# Patient Record
Sex: MALE | Race: WHITE | NOT HISPANIC OR LATINO | URBAN - METROPOLITAN AREA
[De-identification: names, ages, dates, MRNs, and addresses within clinical notes are randomized per-mention and may not be internally consistent; named-entity substitution may affect disease eponyms.]

---

## 2017-07-08 ENCOUNTER — EMERGENCY (EMERGENCY)
Facility: HOSPITAL | Age: 42
LOS: 1 days | Discharge: HOME | End: 2017-07-08
Attending: EMERGENCY MEDICINE | Admitting: EMERGENCY MEDICINE
Payer: COMMERCIAL

## 2017-07-08 VITALS
DIASTOLIC BLOOD PRESSURE: 75 MMHG | RESPIRATION RATE: 16 BRPM | SYSTOLIC BLOOD PRESSURE: 111 MMHG | HEART RATE: 92 BPM | TEMPERATURE: 98 F | OXYGEN SATURATION: 100 %

## 2017-07-08 PROCEDURE — 73562 X-RAY EXAM OF KNEE 3: CPT

## 2017-07-08 PROCEDURE — 99283 EMERGENCY DEPT VISIT LOW MDM: CPT

## 2017-07-08 PROCEDURE — 99283 EMERGENCY DEPT VISIT LOW MDM: CPT | Mod: 25

## 2017-07-08 PROCEDURE — 73562 X-RAY EXAM OF KNEE 3: CPT | Mod: 26,RT

## 2017-07-08 RX ORDER — IBUPROFEN 200 MG
400 TABLET ORAL ONCE
Qty: 0 | Refills: 0 | Status: COMPLETED | OUTPATIENT
Start: 2017-07-08 | End: 2017-07-08

## 2017-07-08 RX ADMIN — Medication 400 MILLIGRAM(S): at 18:50

## 2017-07-08 NOTE — ED PROVIDER NOTE - PHYSICAL EXAMINATION
NAD, VSS, Afebrile, No spinal tender, + right knee laterally tender without obvious swelling, full ROMs, N/V- intact, No posterior or calf tender.

## 2017-07-08 NOTE — ED PROVIDER NOTE - MEDICAL DECISION MAKING DETAILS
42yo male pt, no PMHx c/o right knee pain s/p injury, his leg slipped and he felt right knee snap today. + worsening pain with weight bearing. Denies fall or other injuries. Denies sensory changes or weakness to extremities. s/p twisted, pain control, xray, knee immobilizer, f/u w ortho, reassess

## 2017-07-08 NOTE — ED PROVIDER NOTE - OBJECTIVE STATEMENT
40yo male pt, no PMHx c/o right knee pain s/p injury, his leg slipped and he felt right knee snap today. + worsening pain with weight bearing. Denies fall or other injuries. 40yo male pt, no PMHx c/o right knee pain s/p injury, his leg slipped and he felt right knee snap today. + worsening pain with weight bearing. Denies fall or other injuries. Denies sensory changes or weakness to extremities.

## 2017-07-08 NOTE — ED PROCEDURE NOTE - CPROC ED POST PROC CARE GUIDE1
Verbal/written post procedure instructions were given to patient/caregiver./Instructed patient/caregiver to follow-up with primary care physician./Instructed patient/caregiver regarding signs and symptoms of infection./Elevate the injured extremity as instructed./Keep the cast/splint/dressing clean and dry.

## 2017-07-08 NOTE — ED ADULT NURSE NOTE - OBJECTIVE STATEMENT
41 yr old male with wife c/o R lateral knee joint like pain, pain with wt bearing, s/p slip while at "pool club", heard crack, since then c/o pain with wt bearing, no deformity/ecchymosis noted, +AROM and straight leg raise intact, +pulses/caprefill intact, took motrin prior to arrival to ED, wife present 41 yr old male with wife c/o R lateral knee joint pain, pain with wt bearing, s/p slip while at "pool club", heard crack, since then c/o pain with wt bearing, no deformity/ecchymosis noted, +AROM and straight leg raise intact, +pulses/caprefill intact, took motrin prior to arrival to ED, wife present

## 2017-07-08 NOTE — ED PROVIDER NOTE - ATTENDING CONTRIBUTION TO CARE
40yo male pt, no PMHx c/o right knee pain s/p injury, his leg slipped and he felt right knee snap today. + worsening pain with weight bearing. Denies fall or other injuries. Denies sensory changes or weakness to extremities. s/p twisted, pain control, xray, knee immobilizer, f/u w ortho, reassess

## 2017-07-11 ENCOUNTER — APPOINTMENT (OUTPATIENT)
Age: 42
End: 2017-07-11

## 2017-07-11 DIAGNOSIS — S76.311A STRAIN OF MUSCLE, FASCIA AND TENDON OF THE POSTERIOR MUSCLE GROUP AT THIGH LEVEL, RIGHT THIGH, INITIAL ENCOUNTER: ICD-10-CM

## 2017-07-11 PROBLEM — Z00.00 ENCOUNTER FOR PREVENTIVE HEALTH EXAMINATION: Status: ACTIVE | Noted: 2017-07-11

## 2020-07-25 ENCOUNTER — TRANSCRIPTION ENCOUNTER (OUTPATIENT)
Age: 45
End: 2020-07-25

## 2021-11-22 ENCOUNTER — TRANSCRIPTION ENCOUNTER (OUTPATIENT)
Age: 46
End: 2021-11-22

## 2022-09-13 ENCOUNTER — OFFICE (OUTPATIENT)
Dept: URBAN - METROPOLITAN AREA CLINIC 27 | Facility: CLINIC | Age: 47
Setting detail: OPHTHALMOLOGY
End: 2022-09-13
Payer: COMMERCIAL

## 2022-09-13 DIAGNOSIS — H01.002: ICD-10-CM

## 2022-09-13 DIAGNOSIS — H52.4: ICD-10-CM

## 2022-09-13 DIAGNOSIS — H01.004: ICD-10-CM

## 2022-09-13 DIAGNOSIS — H01.005: ICD-10-CM

## 2022-09-13 DIAGNOSIS — H01.001: ICD-10-CM

## 2022-09-13 PROCEDURE — 92004 COMPRE OPH EXAM NEW PT 1/>: CPT | Performed by: OPHTHALMOLOGY

## 2022-09-13 PROCEDURE — 92015 DETERMINE REFRACTIVE STATE: CPT | Performed by: OPHTHALMOLOGY

## 2022-09-13 ASSESSMENT — LID EXAM ASSESSMENTS
OS_BLEPHARITIS: LLL LUL 1+ 2+
OD_BLEPHARITIS: RLL RUL 1+ 2+

## 2022-09-13 ASSESSMENT — TONOMETRY
OD_IOP_MMHG: 17
OS_IOP_MMHG: 17

## 2022-09-13 ASSESSMENT — CONFRONTATIONAL VISUAL FIELD TEST (CVF)
OD_FINDINGS: FULL
OS_FINDINGS: FULL

## 2022-09-16 PROBLEM — H52.7 REFRACTIVE ERROR ; BOTH EYES: Status: ACTIVE | Noted: 2022-09-13

## 2022-09-16 ASSESSMENT — VISUAL ACUITY
OD_BCVA: 20/30
OS_BCVA: 20/30-2

## 2022-09-16 ASSESSMENT — AXIALLENGTH_DERIVED
OD_AL: 22.9609
OD_AL: 23.0535
OS_AL: 23.0974
OS_AL: 23.0974

## 2022-09-16 ASSESSMENT — REFRACTION_CURRENTRX
OS_AXIS: 089
OD_OVR_VA: 20/
OD_CYLINDER: +0.75
OS_SPHERE: +0.50
OS_CYLINDER: +0.50
OS_OVR_VA: 20/
OD_SPHERE: +0.50
OD_AXIS: 094

## 2022-09-16 ASSESSMENT — KERATOMETRY
OD_AXISANGLE_DEGREES: 083
OS_K2POWER_DIOPTERS: 45.50
OD_K1POWER_DIOPTERS: 44.00
OD_K2POWER_DIOPTERS: 46.00
OS_AXISANGLE_DEGREES: 091
OS_K1POWER_DIOPTERS: 44.25

## 2022-09-16 ASSESSMENT — REFRACTION_MANIFEST
OD_SPHERE: -0.50
OD_CYLINDER: +1.50
OD_ADD: +2.00
OS_CYLINDER: +1.50
OS_SPHERE: -0.75
OD_VA1: 20/20
OS_AXIS: 80
OS_VA1: 20/20
OD_AXIS: 95
OS_ADD: +2.00

## 2022-09-16 ASSESSMENT — REFRACTION_AUTOREFRACTION
OS_AXIS: 087
OS_CYLINDER: +1.50
OD_CYLINDER: +2.00
OD_SPHERE: -1.00
OS_SPHERE: -0.75
OD_AXIS: 092

## 2022-09-16 ASSESSMENT — SPHEQUIV_DERIVED
OD_SPHEQUIV: 0.25
OS_SPHEQUIV: 0
OD_SPHEQUIV: 0
OS_SPHEQUIV: 0

## 2025-01-07 NOTE — ED PROCEDURE NOTE - NS ED PROC PERFORMED BY1 FT
"Subjective:      Patient ID: Clinton Rosenberg is a 72 y.o. male.    Vitals:  height is 5' 10" (1.778 m) and weight is 115.6 kg (254 lb 13.6 oz). His oral temperature is 98.8 °F (37.1 °C). His blood pressure is 108/64 and his pulse is 74. His oxygen saturation is 95%.     Chief Complaint: Cough    Patient is here for cough, congestion and sob onset 10 days ago. OTC medication coricidin, cough suppressant and promethazine with mild relief.    PATIENT DENIES FEVERS CHILLS HEADACHES HOWEVER REPORTS PERSISTENT COUGH WITH OCCASIONAL SHORTNESS A BREATH WHILE HAVING A COUGHING SPELL.  NO CHEST PAIN.  LUNG EXAM SHOWS NO WHEEZING OR RHONCHI AND CHEST X-RAY NEGATIVE FOR PNEUMONIA HOWEVER GIVEN THE FACT THAT HE HAS HAD 10-12 DAYS OF PERSISTENT SYMPTOMS WILL TREAT FOR BACTERIAL BRONCHITIS WITH DOXYCYCLINE AND GIVEN COUGH MEDICATION WHICH HAS BEEN HELPFUL AS HE TRIED PROMETHAZINE DM AND SUPPRESS THE COUGH.  ENCOURAGED TO GO TO THE ER RETURN FOR ANY CONCERNS OR PROBLEMS.    Cough  This is a new problem. Episode onset: 10 days ago. The problem has been gradually worsening. The problem occurs constantly. The cough is Productive of sputum. Associated symptoms include nasal congestion and shortness of breath. Pertinent negatives include no chest pain, chills, fever, postnasal drip or sore throat. The symptoms are aggravated by lying down. He has tried OTC cough suppressant and prescription cough suppressant (coricidin) for the symptoms. The treatment provided mild relief. His past medical history is significant for bronchitis and pneumonia. There is no history of asthma, bronchiectasis, COPD, emphysema or environmental allergies.       ROS  Constitution: Negative for chills and fever.   HENT:  Negative for postnasal drip, sinus pain and sore throat.    Neck: Negative for neck pain and neck stiffness.   Cardiovascular:  Negative for chest pain and palpitations.   Respiratory:  Positive for cough and shortness of breath.  "   Genitourinary:  Negative for dysuria and hematuria.   Musculoskeletal:  Negative for joint pain.   Skin:  Negative for wound and laceration.   Allergic/Immunologic: Negative for environmental allergies.   Neurological:  Negative for altered mental status, numbness and tingling.   Psychiatric/Behavioral:  Negative for altered mental status.       Objective:     Physical Exam   Constitutional: He is oriented to person, place, and time. He appears well-developed. He is cooperative.  Non-toxic appearance. He does not appear ill. No distress.   HENT:   Head: Normocephalic and atraumatic.   Ears:   Right Ear: Hearing, tympanic membrane, external ear and ear canal normal.   Left Ear: Hearing, tympanic membrane, external ear and ear canal normal.   Nose: Congestion present. No mucosal edema, rhinorrhea or nasal deformity. No epistaxis. Right sinus exhibits no maxillary sinus tenderness and no frontal sinus tenderness. Left sinus exhibits no maxillary sinus tenderness and no frontal sinus tenderness.   Mouth/Throat: Uvula is midline, oropharynx is clear and moist and mucous membranes are normal. No trismus in the jaw. Normal dentition. No uvula swelling. No oropharyngeal exudate, posterior oropharyngeal edema or posterior oropharyngeal erythema.   Eyes: Conjunctivae and lids are normal. No scleral icterus.   Neck: Trachea normal and phonation normal. Neck supple. No edema present. No erythema present. No neck rigidity present.   Cardiovascular: Normal rate, regular rhythm, normal heart sounds and normal pulses.   Pulmonary/Chest: Effort normal and breath sounds normal. No respiratory distress. He has no decreased breath sounds. He has no rhonchi.         Comments: OCCASIONAL COUGH NO WHEEZING NO RHONCHI    Abdominal: Normal appearance.   Musculoskeletal: Normal range of motion.         General: No deformity. Normal range of motion.   Neurological: He is alert and oriented to person, place, and time. He exhibits normal  muscle tone. Coordination normal.   Skin: Skin is warm, dry, intact, not diaphoretic and not pale.   Psychiatric: His speech is normal and behavior is normal. Judgment and thought content normal.   Nursing note and vitals reviewed.       XR CHEST PA AND LATERAL    Result Date: 1/7/2025  EXAMINATION: XR CHEST PA AND LATERAL CLINICAL HISTORY: Shortness of breath TECHNIQUE: PA and lateral views of the chest were performed. COMPARISON: Chest radiograph dated August 1, 2023 FINDINGS: The trachea and cardiomediastinal silhouette remains stable.  There is no evidence of pleural effusions, pneumothoraces or consolidations.  Lungs are clear.  Osseous structures demonstrate no evidence for acute fractures or dislocations.     No acute abnormality. Electronically signed by: Antoine Freitas MD Date:    01/07/2025 Time:    12:36       Assessment:     1. Acute bacterial bronchitis    2. SOB (shortness of breath)        Plan:       Acute bacterial bronchitis    SOB (shortness of breath)  -     XR CHEST PA AND LATERAL; Future; Expected date: 01/07/2025    Other orders  -     doxycycline (VIBRAMYCIN) 100 MG Cap; Take 1 capsule (100 mg total) by mouth 2 (two) times daily. for 10 days  Dispense: 20 capsule; Refill: 0  -     promethazine-dextromethorphan (PROMETHAZINE-DM) 6.25-15 mg/5 mL Syrp; Take 5 mLs by mouth every 8 (eight) hours as needed (COUGH).  Dispense: 118 mL; Refill: 0      Patient Instructions   HYDRATE WITH PLENTY OF FLUIDS   OVER-THE-COUNTER ANTIHISTAMINE FOR RUNNY NOSE POSTNASAL DRIP OR EAR CONGESTION     PROMETHAZINE DM PRESCRIPTION FOR COUGH     DOXYCYCLINE ANTIBIOTIC PRESCRIPTION FOR PROLONGED COUGH    CHEST X-RAY SHOWS NO PNEUMONIA     RETURN FOR ANY CONCERNS OR PROBLEMS AND GO TO THE ER IF WORSE DESPITE TREATMENT REGIMEN ABOVE     SEE BRONCHITIS SHEET                 Violeta